# Patient Record
Sex: MALE | Race: WHITE | URBAN - METROPOLITAN AREA
[De-identification: names, ages, dates, MRNs, and addresses within clinical notes are randomized per-mention and may not be internally consistent; named-entity substitution may affect disease eponyms.]

---

## 2018-08-21 ENCOUNTER — APPOINTMENT (RX ONLY)
Dept: URBAN - METROPOLITAN AREA CLINIC 74 | Facility: CLINIC | Age: 43
Setting detail: DERMATOLOGY
End: 2018-08-21

## 2018-08-21 DIAGNOSIS — L43.8 OTHER LICHEN PLANUS: ICD-10-CM | Status: STABLE

## 2018-08-21 PROCEDURE — ? COUNSELING

## 2018-08-21 PROCEDURE — ? PRESCRIPTION

## 2018-08-21 PROCEDURE — 99213 OFFICE O/P EST LOW 20 MIN: CPT

## 2018-08-21 PROCEDURE — ? TREATMENT REGIMEN

## 2018-08-21 RX ORDER — KETOCONAZOLE 20.5 MG/ML
SHAMPOO, SUSPENSION TOPICAL
Qty: 1 | Refills: 9 | Status: ERX | COMMUNITY
Start: 2018-08-21

## 2018-08-21 RX ORDER — PREDNISONE 10 MG/1
TABLET ORAL
Qty: 30 | Refills: 3 | Status: ERX

## 2018-08-21 RX ADMIN — KETOCONAZOLE: 20.5 SHAMPOO, SUSPENSION TOPICAL at 12:06

## 2018-08-21 ASSESSMENT — LOCATION ZONE DERM: LOCATION ZONE: SCALP

## 2018-08-21 ASSESSMENT — LOCATION DETAILED DESCRIPTION DERM: LOCATION DETAILED: RIGHT OCCIPITAL SCALP

## 2018-08-21 ASSESSMENT — LOCATION SIMPLE DESCRIPTION DERM: LOCATION SIMPLE: POSTERIOR SCALP

## 2018-08-21 NOTE — PROCEDURE: TREATMENT REGIMEN
Plan: Discussed starting Biotin 5000 mcg daily\\nIf any of the plaques or alopecia patches widen, to consider Kenalog injections
Detail Level: Zone
Continue Regimen: Ketoconazole 2% shampoo daily
Modify Regimen: Decrease Prednisone 10mg to every other day

## 2020-11-06 ENCOUNTER — OFFICE VISIT (OUTPATIENT)
Dept: ORTHOPEDIC SURGERY | Age: 45
End: 2020-11-06
Payer: COMMERCIAL

## 2020-11-06 VITALS — TEMPERATURE: 97.8 F | WEIGHT: 236 LBS | HEIGHT: 74 IN | BODY MASS INDEX: 30.29 KG/M2

## 2020-11-06 PROBLEM — L50.0 ALLERGIC URTICARIA: Status: ACTIVE | Noted: 2019-10-09

## 2020-11-06 PROCEDURE — G8419 CALC BMI OUT NRM PARAM NOF/U: HCPCS | Performed by: PHYSICIAN ASSISTANT

## 2020-11-06 PROCEDURE — 99204 OFFICE O/P NEW MOD 45 MIN: CPT | Performed by: PHYSICIAN ASSISTANT

## 2020-11-06 PROCEDURE — G8427 DOCREV CUR MEDS BY ELIG CLIN: HCPCS | Performed by: PHYSICIAN ASSISTANT

## 2020-11-06 PROCEDURE — G8484 FLU IMMUNIZE NO ADMIN: HCPCS | Performed by: PHYSICIAN ASSISTANT

## 2020-11-06 PROCEDURE — 1036F TOBACCO NON-USER: CPT | Performed by: PHYSICIAN ASSISTANT

## 2020-11-06 RX ORDER — KETOCONAZOLE 20 MG/ML
SHAMPOO TOPICAL
COMMUNITY
Start: 2020-10-20

## 2020-11-06 RX ORDER — PREDNISONE 10 MG/1
10 TABLET ORAL
COMMUNITY

## 2020-11-06 RX ORDER — DOXYCYCLINE HYCLATE 100 MG/1
CAPSULE ORAL
COMMUNITY
Start: 2020-10-20

## 2020-11-06 SDOH — HEALTH STABILITY: MENTAL HEALTH: HOW OFTEN DO YOU HAVE A DRINK CONTAINING ALCOHOL?: NEVER

## 2020-11-06 NOTE — LETTER
Patient Name: Susu Ramos MRN: <[id number]>  DOS: 11/6/2020   Diagnosis:   1. Right shoulder pain, unspecified chronicity    2. Rotator cuff tendonitis, right                           Goal:  [x]Decrease Pain and/or Swelling []Increase ROM and/or Flexibility     [x]Increase Function                           []Increase Strength and/or Endurance   []Other   Evaluation:  [x]Evaluation and Treatment []KT-1000   []Isokinetic Exam   []Preoperative Eval    Recommended Modalities:  [x]Modalities of Choice      []HCVS            []Electrical Stimulation     [] Remove Dressing  []Ultrasound        []TENS/TNS    [] Lumbar Traction           [] Cervical Traction   []Phonophoresis         []Hot Pack/Cold Pack   []PT Treatment, Unlisted []Other:  Therapeutic Exercises:    []Isometrics    []Range of Motion []Progressive Exer. []Balance Coordination   []Flexibility  []ROM Limited  []Total Hip Replacement   []Passive  []ROM Full   []Total Knee Replacement  []Active Assisted    []Shoulder Impingement Prog  []Active   []Tennis Elbow Program   []Capsular Shift Regular        []Isokinetics      []Spine Program   []Straight Leg Raises  [] Gait    []Fixation                   [] Supine                                              [] Running   [] Extension   [] Prone   [] Throwing   [] Stabilization   [] AB    [] [place] Sit   [] AD      [] Spine Eval   [] Cervical Eval  [] Conditioning   [] Lumbar  [] Stationary Bike   [] [place] Track   [] Lumbar Exer.  [] Stairmaster         [] Treadmill   [] Functional Cap [] Aquatic Prog.      [] Return to work    Treatment Program:  Frequency: [] 1x  [x] 2x  [] 3x  [] 4x  [] 5x week/month  Duration: [] 1  [] 2  [] 3  [x] 4  [] 5 week/month  Weight Bearing: [] Non  [] 1/4  [] 1/2  [] 3/4  [] Full  ROM: [] Restricted  [] Full  [] Follow established:        [] Other:

## 2020-11-06 NOTE — PROGRESS NOTES
Date:  2020    Name:  Nataliya Sylvester  Address:  [address] [place] [address]    :  1975      Age:   39 y.o.    SSN:  [id number]      Medical Record Number:  <[id number]>    Reason for Visit:    Chief Complaint    Shoulder Pain (np right shoulder. )      DOS:2020     HPI: Nataliya Sylvester is a 39 y.o. right-hand-dominant male here today for evaluation of right shoulder pain that has been ongoing for 2 to 3 months with no associated injury. Patient notices pain primarily when he is reaching across his body and lifting weights overhead, particularly with [employer] press and bench press. States that he sleeps on his right side and occasionally wakes him up at night. Denies any numbness or tingling. Denies any radiating pain. Denies any popping, instability, or catching. In terms of treatment, he endorses taking ibuprofen occasionally with some improvement of symptoms. Denies any formal supervised physical therapy. Primary concern is pain, does not feel weakness. Pain Assessment  Location of Pain: Shoulder  Location Modifiers: Right  Severity of Pain: 2  Quality of Pain: Aching  Duration of Pain: Persistent  Frequency of Pain: Constant  Aggravating Factors: (raising arm)  Limiting Behavior: Yes  Relieving Factors: Rest  Result of Injury: No  Work-Related Injury: No  Are there other pain locations you wish to document?: No  ROS: Review of systems reviewed from Patient History Form completed today and available in the patient's chart under the Media tab. No past medical history on file. Past Surgical History:   Procedure Laterality Date    FACIAL RECONSTRUCTION SURGERY         No family history on file.     Social History     Socioeconomic History    Marital status: Unknown     Spouse name: None    Number of children: None    Years of education: None    Highest education level: None   Occupational History    None   Social Needs    Financial resource strain: None   [employer] insecurity     Worry: None     Inability: None    Transportation needs     Medical: None     Non-medical: None   Tobacco Use    Smoking status: Never Smoker    Smokeless tobacco: Never Used   Substance and Sexual Activity    Alcohol use: Never     Frequency: Never    Drug use: Never    Sexual activity: None   Lifestyle    Physical activity     Days per week: None     Minutes per session: None    Stress: None   Relationships    Social connections     Talks on phone: None     Gets together: None     Attends Zoroastrianism service: None     Active member of club or organization: None     Attends meetings of clubs or organizations: None     Relationship status: None    Intimate partner violence     Fear of current or ex partner: None     Emotionally abused: None     Physically abused: None     Forced sexual activity: None   Other Topics Concern    None   Social History Narrative    None       Current Outpatient Medications   Medication Sig Dispense Refill    doxycycline hyclate (VIBRAMYCIN) 100 MG capsule TAKE 1 CAPSULE BY MOUTH EVERY DAY      ketoconazole (NIZORAL) 2 % shampoo APPLY TO SCALP IN THE SHOWER 2 3 TIMES WEEKLY. ALLOW TO SIT 5 MINUTES THEN RINSE OFF.  predniSONE (DELTASONE) 10 MG tablet Take 10 mg by mouth       No current facility-administered medications for this visit. Allergies   Allergen Reactions    Penicillins Swelling       Vital signs:  Temp 97.8 °F (36.6 °C)   Ht 6' 2\" (1.88 m)   Wt 236 lb (107 kg)   BMI 30.30 kg/m²          Neuro: Alert & oriented x 3,  normal,  no focal deficits noted. Normal affect. Eyes: sclera clear  Ears: Normal external ear  Mouth:  No perioral lesions  Pulm: Respirations unlabored and regular  Pulse: Extremities well perfused. 2+ peripheral pulses. Skin: Warm. No ulcerations. Constitutional: The physical examination finds the patient to be well-developed and well-nourished.   The patient is alert and oriented x3 and was cooperative throughout the visit. Right shoulder exam    Inspection:  Held in a normal posture. Normal contour at the acromioclavicular joint. No swelling, ecchymosis, or erythema about the shoulder. No atrophy appreciated. No scapular winging. Palpation:  No subacromial crepitus . No greater tuberosity tenderness. No tenderness in the bicipital groove. Tenderness of AC joint,     Range of Motion: Full passive and active ROM. Normal scapulothoracic rhythm. Mild pain with end ranges of forward flexion and abduction    Strength:  Normal supraspinatus, infraspinatus, and subscapularis muscle strength. Stability: No anterior instability. No posterior instability. Special Tests: Impingement findings are positive. Labral findings are negative. Speed sign and Yergason signs are both negative. Crossover sign is negative. Belly press sign is negative. Lift off sign is negative. Other findings: The skin is warm dry and well perfused. Distally neurovascularly intact. Sensation is intact to light touch over the deltoid. Left comparison shoulder exam    Inspection:  Held in a normal posture. Normal contour at the acromioclavicular joint. No swelling, ecchymosis, or erythema about the shoulder. No atrophy appreciated. No scapular winging. Palpation:  No subacromial crepitus. No tenderness of the AC joint. No greater tuberosity tenderness. No tenderness in the bicipital groove. Range of Motion: Full passive and active ROM. Normal scapulothoracic rhythm. Strength:  Normal supraspinatus, infraspinatus, and subscapularis muscle strength. Stability: No anterior instability. No posterior instability. Special Tests: Impingement findings are negative. Labral findings are negative. Speed sign and Yergason signs are both negative. Crossover sign is negative. Belly press sign is negative. Lift off sign is negative. Other findings: The skin is warm dry and well perfused. Distally neurovascularly intact.  Sensation is intact to light touch over the deltoid. Diagnostics:  Radiology:       Pertinent imaging reviewed, images only - no report available. X-rays of the Right shoulder including Grashey,  scapular Y and  axillary views were obtained and reviewed in office:    Impression: No acute osseous abnormality, no narrowing of joint space, no evidence of fracture, no perceived arthritic changes      Assessment: 39 y.o. male with right shoulder rotator cuff tendinitis, with impingement findings    Plan: Pertinent imaging was reviewed. The etiology, natural history, and treatment options for the disorder were discussed. The roles of activity medication, antiinflammatories, injections, bracing, physical therapy, and surgical interventions were all described to the patient and questions were answered. I believe Mr. Mora's symptoms are associated with rotator cuff tendinitis with subacromial impingement. I am reassured by his rotator cuff strength have low suspicion for rotator cuff tear. I am also reassured by his glenohumeral range of motion and I am not concerned for GIRD. With that being said I believe he is a candidate for formal supervised physical therapy working on scapular stabilization as well as gentle rotator cuff strengthening. He was advised to avoid any overhead lifting, and heavy bench press for the time being and to follow the guidance of his physical therapist.    I will see him back in 1 month, sooner if no improvement or worsening symptoms  Samir Wilson is in agreement with this plan. All questions were answered to patient's satisfaction and was encouraged to call with any further questions. The patient was advised that NSAID-type medications have several potential side effects that include: gastrointestinal irritation including hemorrhage, renal injury, as well as an increased risk for heart attack and stroke.  The patient was asked to take the medication with food and to stop if there is any symptoms of GI upset, including heartburn, nausea, increased gas or diarrhea. I asked the patient to contact their medical provider for vomiting, abdominal pain or black/bloody stools. The patient should have renal function testing per his medical provider periodically if the medication is taken on a regular basis. The patient should be alert for any swelling in the lower extremities and should stop taking the medication immediately and contact their medical provider should this occur. In addition, the patient should stop taking the medication immediately and contact their medical provider should there be any shortness of breath, fatigue and be evaluated in an emergency facility for any chest pain. The patient expresses understanding of these issues and questions were answered. Yury Wright, [address]  11/6/2020    This dictation was performed with a verbal recognition program [facility]) and it was checked for errors. It is possible that there are still dictated errors within this office note. If so, please bring any areas to my attention for an addendum. All efforts were made to ensure that this office note is accurate.

## 2020-11-13 ENCOUNTER — HOSPITAL ENCOUNTER (OUTPATIENT)
Dept: PHYSICAL THERAPY | Age: 45
Setting detail: THERAPIES SERIES
Discharge: HOME | End: 2020-11-13
Payer: COMMERCIAL

## 2020-11-13 PROCEDURE — 97110 THERAPEUTIC EXERCISES: CPT

## 2020-11-13 PROCEDURE — 97112 NEUROMUSCULAR REEDUCATION: CPT

## 2020-11-13 PROCEDURE — 97161 PT EVAL LOW COMPLEX 20 MIN: CPT

## 2020-11-13 NOTE — PLAN OF CARE
The [address] [place] [address]  Phone [phone]  Fax [phone]      Physical Therapy Certification    Dear Referring Practitioner: Kacey Omalley MD,    We had the pleasure of evaluating the following patient for physical therapy services at [address] [place] [address]. A summary of our findings can be found in the initial assessment below. This includes our plan of care. If you have any questions or concerns regarding these findings, please do not hesitate to contact me at the office phone number checked above. Thank you for the referral.       Physician Signature:_______________________________Date:__________________  By signing above (or electronic signature), therapists plan is approved by physician      Patient: Adeline Atkins   : 1975   MRN: [id number]  Referring Physician: Referring Practitioner: Kacey Omalley MD      Evaluation Date: 2020      Medical Diagnosis Information:  Diagnosis: C55.839 (ICD-10-CM) - Right shoulder pain, unspecified chronicity, M75.81 (ICD-10-CM) - Rotator cuff tendonitis, right   Treatment Diagnosis: M25.511 Right shoulder pain, M75.41 Right shoulder impingement                                         Insurance information: PT Insurance Information: [facility]    Precautions/ Contra-indications: N/A    C-SSRS Triggered by Intake questionnaire (Past 2 wk assessment):   [x] No, Questionnaire did not trigger screening.   [] Yes, Patient intake triggered further evaluation      [] C-SSRS Screening completed  [] PCP notified via Plan of Care  [] Emergency services notified     Latex Allergy:  [x]NO      []YES  Preferred Language for Healthcare:   [x]English       []other:    SUBJECTIVE: Patient arrived to physical therapy with c/o R shoulder pain that has been present for 2-3 months with no associated injury.  Symptoms increase with reaching across his body and lifting compared to L      Bandages/Dressings/Incisions: N/A     Gait:Independent, WNL     Orthopedic Special Tests: See above                        [x] Patient history, allergies, meds reviewed. Medical chart reviewed. See intake form. Review Of Systems (ROS):  [x]Performed Review of systems (Integumentary, CardioPulmonary, Neurological) by intake and observation. Intake form has been scanned into medical record. Patient has been instructed to contact their primary care physician regarding ROS issues if not already being addressed at this time.        Co-morbidities/Complexities (which will affect course of rehabilitation):   [x]None              Arthritic conditions   []Rheumatoid arthritis (M05.9)  []Osteoarthritis (M19.91)    Cardiovascular conditions   []Hypertension (I10)  []Hyperlipidemia (E78.5)  []Angina pectoris (I20)  []Atherosclerosis (I70)    Musculoskeletal conditions   []Disc pathology   []Congenital spine pathologies   []Prior surgical intervention  []Osteoporosis (M81.8)  []Osteopenia (M85.8)   Endocrine conditions   []Hypothyroid (E03.9)  []Hyperthyroid Gastrointestinal conditions   []Constipation (H92.17)    Metabolic conditions   []Morbid obesity (E66.01)  []Diabetes type 1(E10.65) or 2 (E11.65)   []Neuropathy (G60.9)      Pulmonary conditions   []Asthma (J45)  []Coughing   []COPD (J44.9)    Psychological Disorders  []Anxiety (F41.9)  []Depression (F32.9)   []Other:    []Other:            Barriers to/and or personal factors that will affect rehab potential:              []Age  []Sex              [x]Motivation/Lack of Motivation                        []Co-Morbidities              []Cognitive Function, education/learning barriers              []Environmental, home barriers              [x]profession/work barriers  []past PT/medical experience  []other:  Justification: Pt sedentary work posture and continuing to lift weights are potential barriers to treatment      Falls Risk Assessment (30 days): [x] Falls Risk assessed and no intervention required. [] Falls Risk assessed and Patient requires intervention due to being higher risk   TUG score (>12s at risk):     [] Falls education provided, including         ASSESSMENT:   Functional Impairments              []Noted spinal or UE joint hypomobility              []Noted spinal or UE joint hypermobility              [x]Decreased UE functional ROM              [x]Decreased UE functional strength              []Abnormal reflexes/sensation/myotomal/dermatomal deficits              [x]Decreased RC/scapular/core strength and neuromuscular control              []other:       Functional Activity Limitations (from functional questionnaire and intake)              []Reduced ability to tolerate prolonged functional positions              [x]Reduced ability or difficulty with changes of positions or transfers between positions              [x]Reduced ability to maintain good posture and demonstrate good body mechanics with sitting, bending, and lifting              [] Reduced ability or tolerance with driving and/or computer work              [x]Reduced ability to sleep              [x]Reduced ability to perform lifting, reaching, carrying tasks              []Reduced ability to tolerate impact through UE              []Reduced ability to reach behind back              []Reduced ability to  or hold objects              []Reduced ability to throw or toss an object              []other:       Participation Restrictions              []Reduced participation in self care activities              []Reduced participation in home management activities              [x]Reduced participation in work activities              [x]Reduced participation in social activities. [x]Reduced participation in sport / recreational activities.      Classification:              []Signs/symptoms consistent with post-surgical status including decreased ROM, strength and and/or measurable assessment of functional outcome. [x] EVAL (LOW) 58884 (typically 20 minutes face-to-face)  [] EVAL (MOD) 07930 (typically 30 minutes face-to-face)  [] EVAL (HIGH) 33317 (typically 45 minutes face-to-face)  [] RE-EVAL         PLAN:  Frequency/Duration:  1 days per week for 4 Weeks:  INTERVENTIONS:  [x] Therapeutic exercise including: strength training, ROM, for Upper extremity and core   [x]  NMR activation and proprioception for UE, scap and Core   [x] Manual therapy as indicated for shoulder, scapula and spine to include: Dry Needling/IASTM, STM, PROM, Gr I-IV mobilizations, manipulation. [x] Modalities as needed that may include: thermal agents, E-stim, Biofeedback, US, iontophoresis as indicated  [x] Patient education on joint protection, postural re-education, activity modification, progression of HEP. HEP instruction:   Access Code: [email or web address]   URL: [employer]/   Date: 11/13/2020   Prepared by: Pat Young     Exercises   Supine Thoracic Mobilization Towel Roll Vertical with Arm Stretch - 10 reps - 1 sets - 10 hold - 1-2x daily - 7x weekly   Sleeper Stretch - 10 reps - 1 sets - 10 hold - 1-2x daily - 7x weekly   Prone Scapular Retraction - 10 reps - 1 sets - 5 hold - 1-2x daily - 7x weekly   Sidelying Shoulder ER with Towel and Dumbbell - 10 reps - 3 sets - 1-2x daily - 7x weekly   Wall [place] - 10 reps - 3 sets - 1-2x daily - 7x weekly   Scapular Protraction at Wall - 10 reps - 3 sets - 1-2x daily - 7x weekly        GOALS:   Patient stated goal: Return to weight lifting without pain/ restrictions    [] Progressing: [] Met: [] Not Met: [] Adjusted    Therapist goals for Patient:   Short Term Goals: To be achieved in: 2 weeks  1. Independent in HEP and progression per patient tolerance, in order to prevent re-injury. [] Progressing: [] Met: [] Not Met: [] Adjusted   2.  Patient will have a decrease in pain to facilitate improvement in movement, function, and ADLs as indicated by Functional Deficits. [] Progressing: [] Met: [] Not Met: [] Adjusted    Long Term Goals: To be achieved in: 4 weeks  1. Disability index score of 0% or less for the UEFS to assist with reaching prior level of function. [] Progressing: [] Met: [] Not Met: [] Adjusted  2. Patient will demonstrate increased R shoulder AROM that is equal to L and PROM into IR that is equal to L  to allow for proper joint functioning as indicated by patients Functional Deficits. [] Progressing: [] Met: [] Not Met: [] Adjusted  3. Patient will demonstrate an increase in strength to good scapular and core control  for UE to allow for proper functional mobility as indicated by patients Functional Deficits. [] Progressing: [] Met: [] Not Met: [] Adjusted  4. Patient will return to all functional activities without increased symptoms or restriction. [] Progressing: [] Met: [] Not Met: [] Adjusted  5. Patient will present with increase in L shoulder strength and ROM in order to begin return to lifting progression with no c/o pain     [] Progressing: [] Met: [] Not Met: [] Adjusted     Electronically signed by:  Sandy Hussein PT    Note: If patient does not return for scheduled/ recommended follow up visits, this note will serve as a discharge from care along with most recent update on progress.

## 2020-11-13 NOTE — FLOWSHEET NOTE
The [address] [place] [address],[address], [address], [address] [place] [address]  Phone: [phone]   Fax:     [phone]    Physical Therapy Daily Treatment Note  Date:  2020    Patient Name:  Shanelle Tee    :  1975  MRN: [id number]  Restrictions/Precautions:    Medical/Treatment Diagnosis Information:  Diagnosis: M25.511 (ICD-10-CM) - Right shoulder pain, unspecified chronicity, M75.81 (ICD-10-CM) - Rotator cuff tendonitis, right  Treatment Diagnosis: M25.511 Right shoulder pain, M75.41 Right shoulder impingement  Insurance/Certification information:  PT Insurance Information: [facility]  Physician Information:  Referring Practitioner: Ramos Lorenzo MD  Has the plan of care been signed (Y/N):        []  Yes  [x]  No     Date of Patient follow up with Physician: 20       Is this a Progress Report:     []  Yes  [x]  No        If Yes:  Date Range for reporting period:  Beginning 20  Ending    Progress report will be due (10 Rx or 30 days whichever is less):        Recertification will be due (POC Duration  / 90 days whichever is less): 20         Visit # Insurance Allowable Auth Required   [address] [place] [address] necessity []  Yes [x]  No        Functional Scale: UEFI 2% deficit    Date assessed:  20     Latex Allergy:  [x]NO      []YES  Preferred Language for Healthcare:   [x]English       []other:    Pain level:  4/10     SUBJECTIVE:  See eval    OBJECTIVE: See eval   Observation:    Test measurements:      RESTRICTIONS/PRECAUTIONS: N/A    Exercises/Interventions:   Exercises:  Exercise/Equipment Resistance/Repetitions Other comments   Stretching/PROM     Pec stretch 10\"hx10   supine T, towel roll between shoulder blades   Sleeper stretch 10\"hx10 R  side lying   Wall Hewlett 1x10 bilat  cues for proper scapular activation and posture                   Isometrics                              PRE's     Flexion Abduction     External Rotation 3# 3x10 R 11/13 side lying   Internal Rotation     Shrugs     EXT     Reverse Flys     Serratus 3x10 bilat 11/13 Push up+ @ wall   Horizontal Abd with ER     Biceps     Triceps     Retraction          Cable Column/Theraband     External Rotation     Internal Rotation     Shrugs     Lats     Ext     Flex     Scapular Retraction     BIC     TRIC     PNF          Dynamic Stability          Plyoback          Manual interventions                     Therapeutic Exercise and NMR EXR  [x] (16355) Provided verbal/tactile cueing for activities related to strengthening, flexibility, endurance, ROM  for improvements in scapular, scapulothoracic and UE control with self care, reaching, carrying, lifting, house/yardwork, driving/computer work. [x] (80918) Provided verbal/tactile cueing for activities related to improving balance, coordination, kinesthetic sense, posture, motor skill, proprioception  to assist with  scapular, scapulothoracic and UE control with self care, reaching, carrying, lifting, house/yardwork, driving/computer work. Therapeutic Activities:    [] (72000 or 06019) Provided verbal/tactile cueing for activities related to improving balance, coordination, kinesthetic sense, posture, motor skill, proprioception and motor activation to allow for proper function of scapular, scapulothoracic and UE control with self care, carrying, lifting, driving/computer work.      Home Exercise Program:    [x] (88808) Reviewed/Progressed HEP activities related to strengthening, flexibility, endurance, ROM of scapular, scapulothoracic and UE control with self care, reaching, carrying, lifting, house/yardwork, driving/computer work  [] (55772) Reviewed/Progressed HEP activities related to improving balance, coordination, kinesthetic sense, posture, motor skill, proprioception of scapular, scapulothoracic and UE control with self care, reaching, carrying, lifting, house/yardwork, driving/computer work      Manual Treatments:  PROM / STM / Oscillations-Mobs:  G-I, II, III, IV (PA's, Inf., Post.)  [] (18683) Provided manual therapy to mobilize soft tissue/joints of cervical/CT, scapular GHJ and UE for the purpose of modulating pain, promoting relaxation,  increasing ROM, reducing/eliminating soft tissue swelling/inflammation/restriction, improving soft tissue extensibility and allowing for proper ROM for normal function with self care, reaching, carrying, lifting, house/yardwork, driving/computer work    Modalities:      Charges:  Timed Code Treatment Minutes: 24'+eval   Total Treatment Minutes: 7:48-8:28  40'       [x] EVAL (LOW) 79175 (typically 20 minutes face-to-face)  [] EVAL (MOD) 58197 (typically 30 minutes face-to-face)  [] EVAL (HIGH) 19182 (typically 45 minutes face-to-face)  [] RE-EVAL     [x] BC(89357) x  1   [] IONTO  [x] NMR (08272) x   1  [] VASO  [] Manual (53411) x      [] Other:  [] TA x      [] Mech Traction (05323)  [] ES(attended) (82783)      [] ES (un) (19600):     GOALS:  Patient stated goal: Return to weight lifting without pain/ restrictions    []? Progressing: []? Met: []? Not Met: []? Adjusted     Therapist goals for Patient:   Short Term Goals: To be achieved in: 2 weeks  1. Independent in HEP and progression per patient tolerance, in order to prevent re-injury. []? Progressing: []? Met: []? Not Met: []? Adjusted   2. Patient will have a decrease in pain to facilitate improvement in movement, function, and ADLs as indicated by Functional Deficits. []? Progressing: []? Met: []? Not Met: []? Adjusted     Long Term Goals: To be achieved in: 4 weeks  1. Disability index score of 0% or less for the UEFS to assist with reaching prior level of function. []? Progressing: []? Met: []? Not Met: []? Adjusted  2.  Patient will demonstrate increased R shoulder AROM that is equal to L and PROM into IR that is equal to L  to allow for proper joint functioning as indicated by patients Functional Deficits. []? Progressing: []? Met: []? Not Met: []? Adjusted  3. Patient will demonstrate an increase in strength to good scapular and core control  for UE to allow for proper functional mobility as indicated by patients Functional Deficits. []? Progressing: []? Met: []? Not Met: []? Adjusted  4. Patient will return to all functional activities without increased symptoms or restriction. []? Progressing: []? Met: []? Not Met: []? Adjusted  5. Patient will present with increase in L shoulder strength and ROM in order to begin return to lifting progression with no c/o pain     []? Progressing: []? Met: []? Not Met: []? Adjusted       Overall Progression Towards Functional goals/ Treatment Progress Update:  [] Patient is progressing as expected towards functional goals listed. [] Progression is slowed due to complexities/Impairments listed. [] Progression has been slowed due to co-morbidities. [x] Plan just implemented, too soon to assess goals progression <30days   [] Goals require adjustment due to lack of progress  [] Patient is not progressing as expected and requires additional follow up with physician  [] Other    Prognosis for POC: [x] Good [] Fair  [] Poor      Patient requires continued skilled intervention: [x] Yes  [] No    Treatment/Activity Tolerance:  [x] Patient able to complete treatment  [] Patient limited by fatigue  [] Patient limited by pain     [] Patient limited by other medical complications  [] Other:   11/13 Tolerated treatment well with moderate fatigue during SL ER but no increase in pain reported or noted. Patient Education:       11/13 Educated on anatomy of shoulder and proper kinematics as well as how objective deficits are contributing to symptoms. Educated on precautions and avoiding weighted overhead press to decrease symptoms. Educated on use of ice and importance of HEP.   [address] [place] [address] code:  [id number]            PLAN: See keron  []

## 2020-11-20 ENCOUNTER — APPOINTMENT (OUTPATIENT)
Dept: PHYSICAL THERAPY | Age: 45
End: 2020-11-20
Payer: COMMERCIAL

## 2020-12-04 ENCOUNTER — HOSPITAL ENCOUNTER (OUTPATIENT)
Dept: PHYSICAL THERAPY | Age: 45
Setting detail: THERAPIES SERIES
Discharge: HOME | End: 2020-12-04
Payer: COMMERCIAL

## 2020-12-04 ENCOUNTER — OFFICE VISIT (OUTPATIENT)
Dept: ORTHOPEDIC SURGERY | Age: 45
End: 2020-12-04
Payer: COMMERCIAL

## 2020-12-04 VITALS — TEMPERATURE: 96.4 F | HEIGHT: 74 IN | BODY MASS INDEX: 30.8 KG/M2 | WEIGHT: 240 LBS

## 2020-12-04 PROCEDURE — G8417 CALC BMI ABV UP PARAM F/U: HCPCS | Performed by: ORTHOPAEDIC SURGERY

## 2020-12-04 PROCEDURE — 1036F TOBACCO NON-USER: CPT | Performed by: ORTHOPAEDIC SURGERY

## 2020-12-04 PROCEDURE — 97110 THERAPEUTIC EXERCISES: CPT

## 2020-12-04 PROCEDURE — G8427 DOCREV CUR MEDS BY ELIG CLIN: HCPCS | Performed by: ORTHOPAEDIC SURGERY

## 2020-12-04 PROCEDURE — 99213 OFFICE O/P EST LOW 20 MIN: CPT | Performed by: ORTHOPAEDIC SURGERY

## 2020-12-04 PROCEDURE — 97112 NEUROMUSCULAR REEDUCATION: CPT

## 2020-12-04 PROCEDURE — G8484 FLU IMMUNIZE NO ADMIN: HCPCS | Performed by: ORTHOPAEDIC SURGERY

## 2020-12-04 NOTE — PROGRESS NOTES
History of Present Illness:  Melissa Carlin is a 39 y.o. male for follow-up regarding his right shoulder. At his last visit he was diagnosed with rotator cuff tendinitis with impingement that has been ongoing for 2 to 3 months. Patient's primary symptom is pain with  press and bench press, and also has some discomfort when sleeping on his right shoulder. Continues to deny any numbness or tingling continues to deny any popping clicking instability or catching. He was on a steady dose of ibuprofen 3 times a day and was in formal supervised physical therapy, he only started experiencing slight improvement in symptoms this week. Medical History:  Patient's medications, allergies, past medical, surgical, social and family histories were reviewed and updated as appropriate. Pain Assessment  Location of Pain: Shoulder  Location Modifiers: Right  Severity of Pain: 3  Quality of Pain: Sharp  Frequency of Pain: Intermittent  Limiting Behavior: Some  Relieving Factors: Nsaids  Result of Injury: No  Work-Related Injury: No  Are there other pain locations you wish to document?: No  ROS: Review of systems reviewed from Patient History Form completed today and available in the patient's chart under the Media tab. Pertinent items are noted in HPI  Review of systems reviewed from Patient History Form completed today and available in the patient's chart under the Media tab. Vital Signs:  Temp 96.4 °F (35.8 °C)   Ht 6' 2\" (1.88 m)   Wt 240 lb (108.9 kg)   BMI 30.81 kg/m²         Neuro: Alert & oriented x 3,  normal,  no focal deficits noted. Normal affect. Eyes: sclera clear  Ears: Normal external ear  Mouth:  No perioral lesions  Pulm: Respirations unlabored and regular  Pulse: Extremities well perfused. 2+ peripheral pulses. Skin: Warm. No ulcerations. Constitutional: The physical examination finds the patient to be well-developed and well-nourished.   The patient is alert and oriented x3 and was cooperative throughout the visit.          Right shoulder exam     Inspection:  Held in a normal posture. Normal contour at the acromioclavicular joint. No swelling, ecchymosis, or erythema about the shoulder. No atrophy appreciated. No scapular winging.      Palpation:  No subacromial crepitus . No greater tuberosity tenderness. No tenderness in the bicipital groove. Tenderness of AC joint      Range of Motion: Full passive and active ROM. Normal scapulothoracic rhythm.       Strength:  Normal supraspinatus, infraspinatus, and subscapularis muscle strength.     Stability: No anterior instability. No posterior instability.     Special Tests: Impingement findings are positive. Labral findings are negative. Speed sign and Yergason signs are both negative. Crossover sign is negative. Belly press sign is negative. Lift off sign is negative.     Other findings: The skin is warm dry and well perfused. Distally neurovascularly intact. Sensation is intact to light touch over the deltoid.        Left comparison shoulder exam     Inspection:  Held in a normal posture. Normal contour at the acromioclavicular joint. No swelling, ecchymosis, or erythema about the shoulder. No atrophy appreciated. No scapular winging.      Palpation:  No subacromial crepitus. No tenderness of the AC joint. No greater tuberosity tenderness. No tenderness in the bicipital groove.     Range of Motion: Full passive and active ROM. Normal scapulothoracic rhythm.     Strength:  Normal supraspinatus, infraspinatus, and subscapularis muscle strength.     Stability: No anterior instability. No posterior instability.     Special Tests: Impingement findings are negative. Labral findings are negative. Speed sign and Yergason signs are both negative. Crossover sign is negative. Belly press sign is negative. Lift off sign is negative.     Other findings: The skin is warm dry and well perfused. Distally neurovascularly intact.  Sensation is intact to light touch over the deltoid. Radiology:       Pertinent imaging reviewed, images only - no report available. No new imaging was obtained during today's visit. Assessment :  39 y.o. male with subacromial impingement findings    Impression:  Encounter Diagnoses   Name Primary?  Right shoulder pain, unspecified chronicity Yes    Rotator cuff tendonitis, right        Office Procedures:  Orders Placed This Encounter   Procedures    MRI SHOULDER RIGHT WO CONTRAST     Standing Status:   Future     Standing Expiration Date:   12/4/2021     Order Specific Question:   Reason for exam:     Answer:   MRI R SHOULDER W/3D  R/O RCT     Order Specific Question:   Reason for exam:     Answer:   Reddy Apodaca TO [facility] P[facility]           Plan:   Pertinent imaging was reviewed. The etiology, natural history, and treatment options for the disorder were discussed. The roles of activity medication, antiinflammatories, injections, bracing, physical therapy, and surgical interventions were all described to the patient and questions were answered.  has seen no improvement with physical therapy and he continues to feel pain over his [facility] joint. I believe he is a candidate for a right shoulder MRI to evaluate for RC tear. If no rotator cuff tear and AC joint arthritis is appreciates we may opt for a cortisone injection. He would like to proceed with this. I will see him back for his MRI review. Giovanni Toth is in agreement with this plan. All questions were answered to patient's satisfaction and was encouraged to call with any further questions. Isaiah Sánchez, Juan Carlos [place] Laura  12/4/2020    During this exam, Isaiah AWAN PA-C, functioned as a scribe for Dr. Luz Maria Joy. The history taking and physical examination were performed by Dr. Luz Maria Joy. All counseling during the appointment was performed between the patient and Dr. Luz Maria Joy.  12/4/2020 1:38 PM    This dictation was performed with a verbal recognition program (DRAGON) and it was checked for errors. It is possible that there are still dictated errors within this office note. If so, please bring any areas to my attention for an addendum. All efforts were made to ensure that this office note is accurate. I personally reviewed the patient's pain scale, review of systems, family history, social history, past medical history, allergies and medications. Review of systems was collected today, reviewed and is included in the medical record. It is available under the media tab. I personally performed the services described in this documentation and scribed by RAFAEL Abad MD  Sports Medicine, Arthroscopic Knee and Shoulder Surgery    This dictation was performed with a verbal recognition program [facility]) and it was checked for errors. It is possible that there are still dictated errors within this office note. If so, please bring any errors to my attention for an addendum. All efforts were made to ensure that this office note is accurate.

## 2020-12-04 NOTE — FLOWSHEET NOTE
[place] [id number] [address]  Phone: [phone]   Fax:     [phone]    Physical Therapy Daily Treatment Note  Date:  2020    Patient Name:  Adeline Atkins    :  1975  MRN: [id number]  Restrictions/Precautions:    Medical/Treatment Diagnosis Information:  Diagnosis: M25.511 (ICD-10-CM) - Right shoulder pain, unspecified chronicity, M75.81 (ICD-10-CM) - Rotator cuff tendonitis, right  Treatment Diagnosis: M25.511 Right shoulder pain, M75.41 Right shoulder impingement  Insurance/Certification information:  PT Insurance Information: [facility]  Physician Information:  Referring Practitioner: Kacey Omalley MD  Has the plan of care been signed (Y/N):        [x]  Yes  []  No     Date of Patient follow up with Physician: 20       Is this a Progress Report:     []  Yes  [x]  No        If Yes:  Date Range for reporting period:  Beginning 20  Ending    Progress report will be due (10 Rx or 30 days whichever is less):        Recertification will be due (POC Duration  / 90 days whichever is less): 20         Visit # Insurance Allowable Auth Required   2   [facility]  Medical necessity []  Yes [x]  No        Functional Scale: UEFI 2% deficit    Date assessed:  20     Latex Allergy:  [x]NO      []YES  Preferred Language for Healthcare:   [x]English       []other:    Pain level:  0/10 @ rest, 3-4/10 with movement      SUBJECTIVE:  : Pt states exercises are going okay and symptoms are about the same. Pt states he has noticed some constant pain in the back of his neck with no known cause. Will get occasional UE tingling when leaning back and looking at phone.  Reaching across his body as gotten better but still has some discomfort with lifting his R UE into flexion and abduction     OBJECTIVE: See eval   Observation:    Test measurements:    ROM PROM AROM  Comment     L R L R   activities related to improving balance, coordination, kinesthetic sense, posture, motor skill, proprioception and motor activation to allow for proper function of scapular, scapulothoracic and UE control with self care, carrying, lifting, driving/computer work. Home Exercise Program:    [x] (95071) Reviewed/Progressed HEP activities related to strengthening, flexibility, endurance, ROM of scapular, scapulothoracic and UE control with self care, reaching, carrying, lifting, house/yardwork, driving/computer work  [] (60040) Reviewed/Progressed HEP activities related to improving balance, coordination, kinesthetic sense, posture, motor skill, proprioception of scapular, scapulothoracic and UE control with self care, reaching, carrying, lifting, house/yardwork, driving/computer work      Manual Treatments:  PROM / STM / Oscillations-Mobs:  G-I, II, III, IV (PA's, Inf., Post.)  [] (11620) Provided manual therapy to mobilize soft tissue/joints of cervical/CT, scapular GHJ and UE for the purpose of modulating pain, promoting relaxation,  increasing ROM, reducing/eliminating soft tissue swelling/inflammation/restriction, improving soft tissue extensibility and allowing for proper ROM for normal function with self care, reaching, carrying, lifting, house/yardwork, driving/computer work    Modalities:      Charges:  Timed Code Treatment Minutes: 40'   Total Treatment Minutes: 9:18-9:58  40'       [] EVAL (LOW) 26021 (typically 20 minutes face-to-face)  [] EVAL (MOD) 01883 (typically 30 minutes face-to-face)  [] EVAL (HIGH) 28698 (typically 45 minutes face-to-face)  [] RE-EVAL     [x] MJ(02673) x  2   [] IONTO  [x] NMR (75868) x   1  [] VASO  [] Manual (54413) x      [] Other:  [] TA x      [] Mech Traction (69311)  [] ES(attended) (24223)      [] ES (un) (40887):     GOALS:  Patient stated goal: Return to weight lifting without pain/ restrictions    []? Progressing: []? Met: []? Not Met: []?  Adjusted     Therapist goals for Patient:   Short Term Goals: To be achieved in: 2 weeks  1. Independent in HEP and progression per patient tolerance, in order to prevent re-injury. []? Progressing: []? Met: []? Not Met: []? Adjusted   2. Patient will have a decrease in pain to facilitate improvement in movement, function, and ADLs as indicated by Functional Deficits. []? Progressing: []? Met: []? Not Met: []? Adjusted     Long Term Goals: To be achieved in: 4 weeks  1. Disability index score of 0% or less for the UEFS to assist with reaching prior level of function. []? Progressing: []? Met: []? Not Met: []? Adjusted  2. Patient will demonstrate increased R shoulder AROM that is equal to L and PROM into IR that is equal to L  to allow for proper joint functioning as indicated by patients Functional Deficits. []? Progressing: []? Met: []? Not Met: []? Adjusted  3. Patient will demonstrate an increase in strength to good scapular and core control  for UE to allow for proper functional mobility as indicated by patients Functional Deficits. []? Progressing: []? Met: []? Not Met: []? Adjusted  4. Patient will return to all functional activities without increased symptoms or restriction. []? Progressing: []? Met: []? Not Met: []? Adjusted  5. Patient will present with increase in L shoulder strength and ROM in order to begin return to lifting progression with no c/o pain     []? Progressing: []? Met: []? Not Met: []? Adjusted       Overall Progression Towards Functional goals/ Treatment Progress Update:  [] Patient is progressing as expected towards functional goals listed. [] Progression is slowed due to complexities/Impairments listed. [] Progression has been slowed due to co-morbidities.   [x] Plan just implemented, too soon to assess goals progression <30days   [] Goals require adjustment due to lack of progress  [] Patient is not progressing as expected and requires additional follow up with physician  [] Other    Prognosis for POC: [x] Good [] Fair  [] Poor      Patient requires continued skilled intervention: [x] Yes  [] No    Treatment/Activity Tolerance:  [x] Patient able to complete treatment  [] Patient limited by fatigue  [] Patient limited by pain     [] Patient limited by other medical complications  [] Other:   12/4 AROM improving overall and appears to be equal to L. Challenged with increase in intensity of shoulder and scapular strengthening but no adverse effects noted. Patient Education:       12/4 Updated HEP and emphasized the importance of continuing with strengthening to improve shoulder stability. 11/13 Educated on anatomy of shoulder and proper kinematics as well as how objective deficits are contributing to symptoms. Educated on precautions and avoiding weighted overhead press to decrease symptoms. Educated on use of ice and importance of HEP. [address] [place] [address] code:  [id number]            PLAN: See eval  [x] Continue per plan of care [] Alter current plan (see comments above)  [x] Plan of care initiated [] Hold pending MD visit [] Discharge  12/4 Continue with scapular/ RTC strengthening as tolerated 1x a week for 1-2 more weeks based on symptoms. Electronically signed by:  Lonnie Boston PT    Note: If patient does not return for scheduled/ recommended follow up visits, this note will serve as a discharge from care along with most recent update on progress.

## 2020-12-08 ENCOUNTER — OFFICE VISIT (OUTPATIENT)
Dept: ORTHOPEDIC SURGERY | Age: 45
End: 2020-12-08
Payer: COMMERCIAL

## 2020-12-08 VITALS — BODY MASS INDEX: 30.8 KG/M2 | TEMPERATURE: 97.1 F | HEIGHT: 74 IN | WEIGHT: 240 LBS

## 2020-12-08 PROCEDURE — 1036F TOBACCO NON-USER: CPT | Performed by: ORTHOPAEDIC SURGERY

## 2020-12-08 PROCEDURE — 99213 OFFICE O/P EST LOW 20 MIN: CPT | Performed by: ORTHOPAEDIC SURGERY

## 2020-12-08 PROCEDURE — G8484 FLU IMMUNIZE NO ADMIN: HCPCS | Performed by: ORTHOPAEDIC SURGERY

## 2020-12-08 PROCEDURE — G8427 DOCREV CUR MEDS BY ELIG CLIN: HCPCS | Performed by: ORTHOPAEDIC SURGERY

## 2020-12-08 PROCEDURE — G8417 CALC BMI ABV UP PARAM F/U: HCPCS | Performed by: ORTHOPAEDIC SURGERY

## 2020-12-08 NOTE — PROGRESS NOTES
History of Present Illness:  Carlos Alberto Mchugh is a 39 y.o. male Tod August for MRI review of the right shoulder. He was seen for rotator cuff tendinitis and AC joint arthritis. MRI was ordered to evaluate for rotator cuff tear. He experiences his pain primarily with weightlifting he does a lot of bench press, [employer]. He also has discomfort sleeping on the affected side. He has recently limited his weight lifting exercise and his pain is improved since the last visit in the past couple of days. Medical History:  Patient's medications, allergies, past medical, surgical, social and family histories were reviewed and updated as appropriate. Pertinent items are noted in HPI  Review of systems reviewed from Patient History Form dated on 12/8/20 and available in the patient's chart under the Media tab. Vital Signs:  Vitals:    12/08/20 1659   Temp: 97.1 °F (36.2 °C)         Neuro: Alert & oriented x 3,  normal,  no focal deficits noted. Normal affect. Eyes: sclera clear  Ears: Normal external ear  Mouth:  No perioral lesions  Pulm: Respirations unlabored and regular  Pulse: Regular rate and rhythm   Skin: Warm, well perfused      Constitutional: The physical examination finds the patient to be well-developed and well-nourished. The patient is alert and oriented x3 and was cooperative throughout the visit. R shoulder exam    Inspection:  Held in a normal posture. Normal contour at the acromioclavicular joint. No swelling, ecchymosis, or erythema about the shoulder. No atrophy appreciated. No scapular winging. Palpation:  No subacromial crepitus. tenderness of the AC joint. No greater tuberosity tenderness. No tenderness in the bicipital groove. Range of Motion: Full passive and active ROM. Normal scapulothoracic rhythm. Strength:  Normal supraspinatus, infraspinatus, and subscapularis muscle strength. Stability: No anterior instability. No posterior instability.     Special Tests: Impingement findings are negative. Labral findings are negative. Speed sign and Yergason signs are both negative. Crossover sign is poss. Belly press sign is negative. Lift off sign is negative. Other findings: The skin is warm dry and well perfused. 2+ radial pulse. Sensation is intact to light touch over the deltoid. L comparison shoulder exam    Inspection:  Held in a normal posture. Normal contour at the acromioclavicular joint. No swelling, ecchymosis, or erythema about the shoulder. No atrophy appreciated. No scapular winging. Palpation:  No subacromial crepitus. No tenderness of the AC joint. No greater tuberosity tenderness. No tenderness in the bicipital groove. Range of Motion: Full passive and active ROM. Normal scapulothoracic rhythm. Strength:  Normal supraspinatus, infraspinatus, and subscapularis muscle strength. Stability: No anterior instability. No posterior instability. Special Tests: Impingement findings are negative. Labral findings are negative. Speed sign and Yergason signs are both negative. Crossover sign is negative. Belly press sign is negative. Lift off sign is negative. Other findings: The skin is warm dry and well perfused. 2+ radial pulse. Sensation is intact to light touch over the deltoid. Radiology:     MRI R shoulder 12/7:  1. Low-grade supra- and infraspinatus tendinosis with subtle bursal surface fraying.  No    evidence of rotator cuff macrotear. 2. Prominent marrow edema and capsular swelling at the [facility] joint as described above. 3. Thin posterosuperior labral tear/fraying. 4. Thin chondral fissures at the posterosuperior glenoid articular surface. Assessment :  39 y.o. male with right AC joint arthritis    Impression:  Encounter Diagnoses   Name Primary?     Rotator cuff tendonitis, right Yes    Arthritis of right acromioclavicular joint        Office Procedures:  No orders of the defined types were placed in this encounter. Plan: Pertinent imaging was reviewed. The etiology, natural history, and treatment options for the disorder were discussed. The roles of activity medication, antiinflammatories, injections, bracing, physical therapy, and surgical interventions were all described to the patient and questions were answered.      -We discussed doing a right AC joint steroid injection. He would like to avoid this at this time. Prescription provided for diclofenac twice daily as needed  -Return as needed    -The patient was advised that NSAID-type medications have several potential side effects that include: gastrointestinal irritation including hemorrhage, renal injury, as well as an increased risk for heart attack and stroke. The patient was asked to take the medication with food and to stop if there is any symptoms of GI upset, including heartburn, nausea, increased gas or diarrhea. I asked the patient to contact their medical provider for vomiting, abdominal pain or black/bloody stools. The patient should have renal function testing per his medical provider periodically if the medication is taken on a regular basis. The patient should be alert for any swelling in the lower extremities and should stop taking the medication immediately and contact their medical provider should this occur. In addition, the patient should stop taking the medication immediately and contact their medical provider should there be any shortness of breath, fatigue and be evaluated in an emergency facility for any chest pain. The patient expresses understanding of these issues and questions were answered. Maria Luisa Kruse is in agreement with this plan. All questions were answered to patient's satisfaction and was encouraged to call with any further questions. I personally reviewed the patient's pain scale, review of systems, family history, social history, past medical history, allergies and medications.   Review of systems was collected today, reviewed and is included in the medical record. It is available under the media tab. I personally performed and or supervised the services described in this documentation and scribed by the sports medicine fellow. Eileen Quiles MD  Sports Medicine, Arthroscopic Knee and Shoulder Surgery    This dictation was performed with a verbal recognition program [facility]) and it was checked for errors. It is possible that there are still dictated errors within this office note. If so, please bring any errors to my attention for an addendum. All efforts were made to ensure that this office note is accurate.